# Patient Record
Sex: MALE | Race: BLACK OR AFRICAN AMERICAN | NOT HISPANIC OR LATINO | Employment: UNEMPLOYED | URBAN - METROPOLITAN AREA
[De-identification: names, ages, dates, MRNs, and addresses within clinical notes are randomized per-mention and may not be internally consistent; named-entity substitution may affect disease eponyms.]

---
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
## 2025-01-26 ENCOUNTER — HOSPITAL ENCOUNTER (OUTPATIENT)
Dept: MRI IMAGING | Facility: CLINIC | Age: 20
Discharge: HOME | End: 2025-01-26
Attending: FAMILY MEDICINE | Admitting: FAMILY MEDICINE
Payer: COMMERCIAL

## 2025-01-26 DIAGNOSIS — R51.9 CHRONIC DAILY HEADACHE: ICD-10-CM

## 2025-01-26 PROCEDURE — 70551 MRI BRAIN STEM W/O DYE: CPT

## 2025-01-27 NOTE — RESULT ENCOUNTER NOTE
Tisha Flores,    If you have not viewed these results on [employer] within 3 days, we will use an alternative method to contact you. We will contact you via the following protocol:    - Via letter if your results are normal.  - Via phone ([phone]) if your results are abnormal.     Here are my comments about your recent results:    Brain MRI was normal. No concerning findings were found.    Please call the clinic ([phone]), or message us on [facility] with any questions you may have.     Have a great day,    Dr. Moreno

## 2025-03-03 NOTE — CONFIDENTIAL NOTE
Reason for visit: Chronic daily headache    Referring Provider: Demarco Lechuga MD   [facility]   Office Visit Notes: 01/17/2025       All IMAGING   STATUS/LOCATION   DATE   MRI/MRA Internal  01/26/2025    CT/CTA Internal 11/8/2010    LABS Internal    EEG N/A    EMG N/A    NEUROPSYCH   TEST: N/A

## 2025-04-17 ENCOUNTER — PRE VISIT (OUTPATIENT)
Dept: NEUROLOGY | Facility: CLINIC | Age: 20
End: 2025-04-17